# Patient Record
Sex: FEMALE | Race: WHITE | Employment: FULL TIME | URBAN - METROPOLITAN AREA
[De-identification: names, ages, dates, MRNs, and addresses within clinical notes are randomized per-mention and may not be internally consistent; named-entity substitution may affect disease eponyms.]

---

## 2019-02-25 ENCOUNTER — OFFICE VISIT (OUTPATIENT)
Dept: ORTHOPEDIC SURGERY | Age: 59
End: 2019-02-25
Payer: COMMERCIAL

## 2019-02-25 VITALS — BODY MASS INDEX: 24.75 KG/M2 | HEIGHT: 66 IN | WEIGHT: 154 LBS

## 2019-02-25 DIAGNOSIS — M25.562 LEFT KNEE PAIN, UNSPECIFIED CHRONICITY: Primary | ICD-10-CM

## 2019-02-25 PROCEDURE — 99213 OFFICE O/P EST LOW 20 MIN: CPT | Performed by: ORTHOPAEDIC SURGERY

## 2019-03-11 ENCOUNTER — OFFICE VISIT (OUTPATIENT)
Dept: ORTHOPEDIC SURGERY | Age: 59
End: 2019-03-11
Payer: COMMERCIAL

## 2019-03-11 VITALS — BODY MASS INDEX: 24.75 KG/M2 | HEIGHT: 66 IN | WEIGHT: 154 LBS

## 2019-03-11 DIAGNOSIS — M25.562 LEFT KNEE PAIN, UNSPECIFIED CHRONICITY: Primary | ICD-10-CM

## 2019-03-11 PROCEDURE — 99213 OFFICE O/P EST LOW 20 MIN: CPT | Performed by: ORTHOPAEDIC SURGERY

## 2019-09-12 ENCOUNTER — HOSPITAL ENCOUNTER (OUTPATIENT)
Dept: WOMENS IMAGING | Age: 59
Discharge: HOME | End: 2019-09-12
Payer: COMMERCIAL

## 2019-09-12 DIAGNOSIS — Z12.31 VISIT FOR SCREENING MAMMOGRAM: ICD-10-CM

## 2019-09-12 PROCEDURE — 77067 SCR MAMMO BI INCL CAD: CPT

## 2019-09-16 ENCOUNTER — TELEPHONE (OUTPATIENT)
Dept: WOMENS IMAGING | Age: 59
End: 2019-09-16

## 2019-09-18 ENCOUNTER — TELEPHONE (OUTPATIENT)
Dept: WOMENS IMAGING | Age: 59
End: 2019-09-18

## 2019-09-18 NOTE — TELEPHONE ENCOUNTER
Called pt to relay information regarding f/u to screening mammogram.  VM inbox full. Will call again at another time.   Lillie Emerson RN

## 2019-09-23 ENCOUNTER — TELEPHONE (OUTPATIENT)
Dept: WOMENS IMAGING | Age: 59
End: 2019-09-23

## 2019-09-23 NOTE — TELEPHONE ENCOUNTER
Called pt regarding f/u to screening mammogram. Call went straight to VIRGIL, VIRGIL inbox full.   Eric Contreras RN

## 2019-09-25 ENCOUNTER — OFFICE VISIT (OUTPATIENT)
Dept: ORTHOPEDIC SURGERY | Age: 59
End: 2019-09-25
Payer: COMMERCIAL

## 2019-09-25 VITALS — WEIGHT: 154.1 LBS | BODY MASS INDEX: 24.77 KG/M2 | HEIGHT: 66 IN

## 2019-09-25 DIAGNOSIS — S90.01XA CONTUSION OF RIGHT ANKLE, INITIAL ENCOUNTER: ICD-10-CM

## 2019-09-25 DIAGNOSIS — M25.571 RIGHT ANKLE PAIN, UNSPECIFIED CHRONICITY: ICD-10-CM

## 2019-09-25 DIAGNOSIS — M25.572 LEFT ANKLE PAIN, UNSPECIFIED CHRONICITY: Primary | ICD-10-CM

## 2019-09-25 PROCEDURE — 99213 OFFICE O/P EST LOW 20 MIN: CPT | Performed by: ORTHOPAEDIC SURGERY

## 2019-09-25 NOTE — PROGRESS NOTES
CHIEF COMPLAINT:    Chief Complaint   Patient presents with    Ankle Pain     RIGHT ANKLE & HEEL. LATERAL. FELL ON 9/22 OFF BOAT LIFT. COULDN'T WALK AT FIRST NOW DOING BETTER       HISTORY OF PRESENT ILLNESS:    Is a very pleasant lady who presents today with ongoing right ankle issues. She was getting out of her [place] the dock and essentially struck the side of her right foot and ankle. She could barely walk can barely move her toes initially but now the symptoms are dramatically improved. Denies any numbness or tending. She denies any radiation of pain. The patient is a 63 y.o. female   Past Medical History:   Diagnosis Date    Chronic low back pain     Depression     Migraine     Panic disorder     gets anxious at work        Work Status:      The pain assessment was noted & is as follows:  Pain Assessment  Location of Pain: Ankle  Location Modifiers: Right  Severity of Pain: 3  Quality of Pain: Dull, Aching  Duration of Pain: A few hours  Frequency of Pain: Several times daily  Aggravating Factors: Bending, Stretching  Limiting Behavior: Some  Relieving Factors: Rest  Result of Injury: Yes  Work-Related Injury: No  Are there other pain locations you wish to document?: No]      Work Status/Functionality:     Past Medical History: Medical history form was reviewed today & can be found in the media tab  Past Medical History:   Diagnosis Date    Chronic low back pain     Depression     Migraine     Panic disorder     gets anxious at work      Past Surgical History:     Past Surgical History:   Procedure Laterality Date    COLONOSCOPY  06/24/2016    normal    KNEE SURGERY      left    NASAL SEPTUM SURGERY      OTHER SURGICAL HISTORY Right 08/2016    open hamstring-proximal repair    WISDOM TOOTH EXTRACTION       Current Medications:     Current Outpatient Medications:     meloxicam (MOBIC) 15 MG tablet, Take 1 tablet by mouth daily, Disp: 30 tablet, Rfl: 3    traMADol (ULTRAM) 50 MG

## 2019-10-14 ENCOUNTER — HOSPITAL ENCOUNTER (OUTPATIENT)
Dept: WOMENS IMAGING | Age: 59
Discharge: HOME | End: 2019-10-14
Payer: COMMERCIAL

## 2019-10-14 ENCOUNTER — HOSPITAL ENCOUNTER (OUTPATIENT)
Dept: WOMENS IMAGING | Age: 59
End: 2019-10-14
Payer: COMMERCIAL

## 2019-10-14 DIAGNOSIS — R92.8 ABNORMAL MAMMOGRAM: ICD-10-CM

## 2019-10-14 PROCEDURE — 77063 BREAST TOMOSYNTHESIS BI: CPT

## 2019-10-14 PROCEDURE — G0279 TOMOSYNTHESIS, MAMMO: HCPCS

## 2020-10-14 ENCOUNTER — OFFICE VISIT (OUTPATIENT)
Dept: ORTHOPEDIC SURGERY | Age: 60
End: 2020-10-14
Payer: COMMERCIAL

## 2020-10-14 VITALS — WEIGHT: 154 LBS | HEIGHT: 65 IN | BODY MASS INDEX: 25.66 KG/M2

## 2020-10-14 PROCEDURE — 99213 OFFICE O/P EST LOW 20 MIN: CPT | Performed by: ORTHOPAEDIC SURGERY

## 2020-10-15 NOTE — PROGRESS NOTES
MD Cricket London PA-C         Orthopaedic Surgery and Sports Medicine    Chief Complaint:  Knee Pain (LEFT)      History of Present Illness:  Prachi Marsh is a 62 y.o. female here left knee. This is a patient that we've seen previously for a hamstring rupture and left knee problems. She 1st injured her left knee a couple years ago while snow skiing, recovered moderately. She has had some episodes of it slipping or feeling unstable. She also has had swelling. The patient is currently ambulating Independently    The pain is located medial.  She is preparing to run return to skiing in January 2021 and is concerned that her left knee is going to continue to be a problem. She wants to prepare as much as possible. The symptoms are made worse with activity, stair climbing, kneeling, deep knee bending, weight bearing. She has had a previous MRI scan of her left knee. MRI of the left knee from March 1, 2019 shows worsening patellofemoral arthropathy. There is a full-thickness delaminated part delayed ulcer on the lateral trochlea. There is no tearing of the ACL. No acute pivot shift injury or macro tear but a small cruciate sheath ganglion cyst distally. Myxoid change in the medial meniscus. No tear. Mild lateral compartment arthropathy. There is a Baker's cyst.      Medical History:  Patient's medications, allergies, past medical, surgical, social and family histories were reviewed and updated as appropriate. Review of Systems:  Pertinent items are noted in HPI  Review of systems reviewed from Patient History Form dated on 02/25/2019 and available in the patient's chart under the Media tab. Vital Signs: There were no vitals filed for this visit.     Pain Assessment:  Pain Assessment  Location of Pain: Knee  Location Modifiers: Left  Severity of Pain: 4  Quality of Pain: Buckling, Sharp, Aching  Frequency explanation. This was performed under direct supervision of the treating clinician. She also was fitted for a roadrunner brace for her to use during extreme exercise such as skiing. She will follow-up with us as needed. Cale Cronin was instructed to call the office if her symptoms worsen or if new symptoms appear prior to the next scheduled visit. She is specifically instructed to contact the office between now and schedule appointment if she has concerns related to her condition or if she needs assistance in scheduling any above tests. She is welcome to call for an appointment sooner if she has any additional concerns or questions. This dictation was performed with a verbal recognition program (DRAGON) and it was checked for errors. It is possible that there are still dictated errors within this office note. If so, please bring any errors to my attention for an addendum. All efforts were made to ensure that this office note is accurate.

## 2023-02-13 ENCOUNTER — HOSPITAL ENCOUNTER (EMERGENCY)
Age: 63
Discharge: HOME | End: 2023-02-13
Attending: STUDENT IN AN ORGANIZED HEALTH CARE EDUCATION/TRAINING PROGRAM
Payer: COMMERCIAL

## 2023-02-13 ENCOUNTER — APPOINTMENT (OUTPATIENT)
Dept: CT IMAGING | Age: 63
End: 2023-02-13
Payer: COMMERCIAL

## 2023-02-13 VITALS
SYSTOLIC BLOOD PRESSURE: 115 MMHG | WEIGHT: 140 LBS | BODY MASS INDEX: 21.97 KG/M2 | HEART RATE: 65 BPM | TEMPERATURE: 98.9 F | OXYGEN SATURATION: 96 % | DIASTOLIC BLOOD PRESSURE: 69 MMHG | RESPIRATION RATE: 18 BRPM | HEIGHT: 67 IN

## 2023-02-13 DIAGNOSIS — S09.90XA CLOSED HEAD INJURY, INITIAL ENCOUNTER: Primary | ICD-10-CM

## 2023-02-13 PROCEDURE — 99284 EMERGENCY DEPT VISIT MOD MDM: CPT

## 2023-02-13 PROCEDURE — 70450 CT HEAD/BRAIN W/O DYE: CPT

## 2023-02-13 RX ORDER — MELATONIN 10 MG
10 CAPSULE ORAL NIGHTLY
COMMUNITY

## 2023-02-13 ASSESSMENT — PAIN - FUNCTIONAL ASSESSMENT: PAIN_FUNCTIONAL_ASSESSMENT: 0-10

## 2023-02-13 ASSESSMENT — PAIN SCALES - GENERAL: PAINLEVEL_OUTOF10: 4

## 2023-02-13 ASSESSMENT — PAIN DESCRIPTION - ORIENTATION: ORIENTATION: LEFT

## 2023-02-13 ASSESSMENT — PAIN DESCRIPTION - LOCATION: LOCATION: HEAD

## 2023-02-13 ASSESSMENT — PAIN DESCRIPTION - DESCRIPTORS: DESCRIPTORS: ACHING

## 2023-02-13 NOTE — ED PROVIDER NOTES
[facility]  ED     EMERGENCY DEPARTMENT ENCOUNTER            Pt Name: Gali Bernal   MRN: [id number]   [place] 1960   Date of evaluation: 2/13/2023   Provider: Laura Iniguez MD   PCP: Emelina Field   Note Started: 1:07 PM EST 2/13/23          CHIEF COMPLAINT     Chief Complaint   Patient presents with    Head Injury     Patient to the ED for head injury from falling on her ski's. States her left forehead struck the [place]" of the ski. States hematoma instantly after injury. States this happened on Friday this past week. States she called her PCP and they told her to come in for CT of head. Denies any blood thinner use, states is starting to get a headache today. HISTORY OF PRESENT ILLNESS:   History from : Patient   Limitations to history : None     Gali Bernal is a 65 y.o. female who presents with concern for head injury. Patient states that 3 days ago, she was walking and she tripped and fell and landed on the brake of her ski. She states that she had significant swelling on her forehead at that point in time. Denies any loss of consciousness. She denies any lightheadedness or presyncopal events prior to her fall. Try to get into see her PCP today and they advised her to come to the ER for a CT of her head. She is not anticoagulated. She denies any vision changes. Denies neck pain. Denies other injuries or other complaints or concerns. Nursing Notes were all reviewed and agreed with, or any disagreements were addressed in the HPI. REVIEW OF SYSTEMS :    Positives and Pertinent negatives as per HPI. MEDICAL HISTORY   has a past medical history of Chronic low back pain, Depression, Migraine, and Panic disorder.     Past Surgical History:   Procedure Laterality Date    COLONOSCOPY  06/24/2016    normal    KNEE SURGERY      left    NASAL SEPTUM SURGERY      OTHER SURGICAL HISTORY Right 08/2016    open hamstring-proximal repair    WISDOM TOOTH EXTRACTION        CURRENTMEDICATIONS       Discharge Medication List as of 2/13/2023  2:53 PM        CONTINUE these medications which have NOT CHANGED    Details   melatonin 10 MG CAPS capsule Take 10 mg by mouth nightlyHistorical Med      escitalopram (LEXAPRO) 20 MG tablet Take 1 tablet by mouth daily. , Disp-102 tablet, R-3      traZODone (DESYREL) 100 MG tablet Take 1 tablet by mouth nightly., Disp-104 tablet, R-3      SUMAtriptan (IMITREX) 100 MG tablet Take 1 tablet by mouth once as needed for up to 1 dose., Disp-27 tablet, R-3            SCREENINGS          Richi Coma Scale  Eye Opening: Spontaneous  Best Verbal Response: Oriented  Best Motor Response: Obeys commands  Richi Coma Scale Score: 15                CIWA Assessment  BP: 115/69  Heart Rate: 65                  PHYSICAL EXAM :  ED Triage Vitals [02/13/23 1304]   BP Temp Temp Source Heart Rate Resp SpO2 Height Weight   (!) 141/78 98.9 °F (37.2 °C) Oral 75 20 96 % 5' 7\" (1.702 m) 140 lb (63.5 kg)      GENERAL APPEARANCE: Awake and alert. Cooperative. No acute distress. HEAD: Small cephalohematoma in center of forehead  EYES: PERRL. EOM's grossly intact. Periorbital ecchymosis bilaterally. TMs clear. ENT: Mucous membranes are moist.   NECK: Supple, trachea midline. HEART: RRR. Normal S1, S2. No murmurs, rubs or gallops. LUNGS: Respirations unlabored. CTAB. Good air exchange. No wheezes, rales, or rhonchi. Speaking comfortably in full sentences. ABDOMEN: Soft. Non-distended. Non-tender. No guarding or rebound. Normal Bowel sounds. EXTREMITIES: No peripheral edema. MAEE. No acute deformities. SKIN: Warm and dry. No acute rashes. NEUROLOGICAL: Alert and oriented X 3. CN II-XII intact. No gross facial drooping. Strength 5/5 in all extremities. Sensation intact. No pronator drift. Normal coordination. Gait normal.   PSYCHIATRIC: Normal mood and affect.     DIAGNOSTIC RESULTS     LABS:   Labs Reviewed - No data to display   When ordered only abnormal lab results are displayed. All other labs were within normal range or not returned as of this dictation. RADIOLOGY:      Non-plain film images such as CT, Ultrasound and MRI are read by the radiologist. Plain radiographic images are visualized and preliminarily interpreted by the ED Provider with the below findings:   Interpretation per the Radiologist below, if available at the time of this note:     CT HEAD WO CONTRAST   Final Result   No acute intracranial abnormality. No results found. EKG: N/A     PROCEDURES   Unless otherwise noted below, none     CRITICAL CARE TIME   0         Vitals:    Vitals:    02/13/23 1304 02/13/23 1504   BP: (!) 141/78 115/69   Pulse: 75 65   Resp: 20 18   Temp: 98.9 °F (37.2 °C)    TempSrc: Oral    SpO2: 96%    Weight: 140 lb (63.5 kg)    Height: 5' 7\" (1.702 m)              Is this patient to be included in the SEP-1 Core Measure due to severe sepsis or septic shock? No   Exclusion criteria - the patient is NOT to be included for SEP-1 Core Measure due to: Infection is not suspected       CC/HPI Summary, DDx, ED Course, and Reassessment: Patient is a 71-year-old female, presenting with concerns for closed head injury that occurred 3 days ago. She was sent in by her primary care provider for head CT. Her neurologic exam is normal.  She is not anticoagulated. Is complaining of a mild headache since her fall. Neurologic exam is normal, does have some periorbital ecchymosis, no panchal sign. No hemotympanum. No neck pain. CT of the head was performed and was negative for acute concerning findings. Patient was reassessed and continued to feel well. She is appropriate for discharge home. Advised follow-up with PCP as needed. She is comfortable and in agreement plan of care.        Patient was given the following medications:   Medications - No data to display     CONSULTS:   None   Discussion with Other Professionals: None  {Social Determinants: None   Chronic Conditions:  None    Records Reviewed: NA      Disposition Considerations: Appropriate for outpatient management  I am the Primary Clinician of Record. FINAL IMPRESSION    1.  Closed head injury, initial encounter           DISPOSITION/PLAN     PATIENT REFERRED TO:   Alix Flor  [phone]    Schedule an appointment as soon as possible for a visit   As needed    [facility]  ED  [address]  Go to   If symptoms worsen     DISCHARGE MEDICATIONS:   Discharge Medication List as of 2/13/2023  2:53 PM           DISCONTINUED MEDICATIONS:   Discharge Medication List as of 2/13/2023  2:53 PM        STOP taking these medications       meloxicam (MOBIC) 15 MG tablet Comments:   Reason for Stopping:         traMADol (ULTRAM) 50 MG tablet Comments:   Reason for Stopping:         oxyCODONE-acetaminophen (PERCOCET) 5-325 MG per tablet Comments:   Reason for Stopping:         ibuprofen (ADVIL;MOTRIN) 200 MG tablet Comments:   Reason for Stopping:         docusate sodium (COLACE) 100 MG capsule Comments:   Reason for Stopping:                      (Please note that portions of this note were completed with a voice recognition program.  Efforts were made to edit the dictations but occasionally words are mis-transcribed.)       Claire Orozco MD (electronically signed)             Claire Orozco MD  02/13/23 0195

## 2025-04-03 ENCOUNTER — HOSPITAL ENCOUNTER (OUTPATIENT)
Dept: MAMMOGRAPHY | Age: 65
Discharge: HOME | End: 2025-04-03
Payer: COMMERCIAL

## 2025-04-03 VITALS — BODY MASS INDEX: 21.97 KG/M2 | WEIGHT: 140 LBS | HEIGHT: 67 IN

## 2025-04-03 DIAGNOSIS — Z12.31 VISIT FOR SCREENING MAMMOGRAM: ICD-10-CM

## 2025-04-03 PROCEDURE — 77067 SCR MAMMO BI INCL CAD: CPT

## 2025-04-03 PROCEDURE — 77063 BREAST TOMOSYNTHESIS BI: CPT
